# Patient Record
Sex: MALE | Race: WHITE | NOT HISPANIC OR LATINO | Employment: STUDENT
[De-identification: names, ages, dates, MRNs, and addresses within clinical notes are randomized per-mention and may not be internally consistent; named-entity substitution may affect disease eponyms.]

---
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
## 2023-08-09 RX ORDER — OXCARBAZEPINE 600 MG/1
600 TABLET, FILM COATED ORAL
COMMUNITY
Start: 2022-01-30 | End: 2023-10-10

## 2023-08-09 RX ORDER — AMANTADINE HYDROCHLORIDE 100 MG/1
100 CAPSULE, GELATIN COATED ORAL
COMMUNITY
Start: 2022-12-27

## 2023-08-09 RX ORDER — CLONAZEPAM 0.5 MG/1
0.5 TABLET ORAL
COMMUNITY
Start: 2023-06-12 | End: 2023-09-10

## 2023-08-09 RX ORDER — LURASIDONE HYDROCHLORIDE 80 MG/1
80 TABLET, FILM COATED ORAL
COMMUNITY
Start: 2022-03-03

## 2023-08-09 RX ORDER — CLONAZEPAM 2 MG/1
2 TABLET ORAL
COMMUNITY
Start: 2023-06-12 | End: 2023-09-10

## 2023-08-09 RX ORDER — GUANFACINE 1 MG/1
5 TABLET ORAL AT BEDTIME
COMMUNITY

## 2023-08-09 RX ORDER — OLANZAPINE 10 MG/1
TABLET, ORALLY DISINTEGRATING ORAL
COMMUNITY
Start: 2022-03-08

## 2023-08-09 RX ORDER — SERTRALINE HYDROCHLORIDE 100 MG/1
100 TABLET, FILM COATED ORAL 2 TIMES DAILY
COMMUNITY

## 2023-08-09 NOTE — OR NURSING
Pt has extensive medical history, mom called about faxing orders for labs to be drawn under sedation. Mother expressed pt will need sedation prior to having IV started.

## 2023-08-14 NOTE — H&P (VIEW-ONLY)
[facility]      Preoperative Consultation   Alfonzo Saldivar   : 2005   Gender: male    Date of Encounter: 2023  Date of Surgery: 23  Surgical Specialty: Gastroenterology  [facility] Facility: [facility]  Fax number: [phone]  Surgery type: outpatient  Primary Physician: Pcp, No     History of Present Illness     Alfonzo Saldivar is a 17 y.o. male (2005) who presents for preop evaluation undergoing colonoscopy and endoscopy       Review of Systems   A comprehensive review of systems was negative except for items noted in HPI.    Patient Active Problem List   Diagnosis Code     Autism F84.0     Acanthosis nigricans, acquired L83     BMI (body mass index) pediatric, > 99% for age, obese child, tertiary care intervention E66.9, Z68.54     Dental caries K02.9     Current Outpatient Medications   Medication Sig     amantadine HCL (SYMMETREL) 100 mg capsule Take 100 mg by mouth once daily.     clonazePAM (KLONOPIN) 1 mg tablet TAKE 1 TABLET BY MOUTH IN THE MORNING AND 2 TABLETS IN THE EVENING.     guanFACINE (INTUNIV ER) 4 mg Extended-Release tablet Take 4 mg by mouth once daily.     Latuda 80 mg tablet Take 80 mg by mouth.     OLANzapine (ZYPREXA ZYDIS) 10 mg disintegrating tablet TAKE 1 TABLET BY MOUTH EVERYDAY AS NEEDED FOR AGITATION FOR 60 DAYS     OXcarbazepine (TRILEPTAL) 600 mg tablet TAKE 1 TABLET BY MOUTH TWICE A DAY IN THE MORNING AND EVENING     sertraline (ZOLOFT) 100 mg tablet Take 100 mg by mouth once daily.     No current facility-administered medications for this visit.   No Known Allergies  Past Surgical History:   . Laterality Date     DENTAL REHABILITATION  2020    [facility]; with nasal intubation     DENTAL RESTORATIONS WITH X-RAYS  2012     Social History     Tobacco Use     Smoking status: Passive Smoke Exposure - Never Smoker     Smokeless tobacco: Never     Tobacco comments:     mother  and father smokes   Vaping Use     Vaping Use: Never used   Substance Use Topics     Alcohol use: No     Alcohol/week: 0.0 standard drinks of alcohol     Drug use: No     Family History   Problem Relation Age of Onset     Hypertension Mother      Depression Mother      Anxiety disorder Mother      Addiction problem Mother         opiates, in remission >10 years     Hypertension Father      Depression Father      Anxiety disorder Father      Depression Brother        PAST DIFFICULTY WITH ANESTHESIA: None     Physical Exam   /92 (Cuff Site: Left Arm, Position: Sitting, Cuff Size: Adult Large)   Pulse 88   Resp 20  There is no height or weight on file to calculate BMI.  General Appearance:  Alert, appropriate appearance for age. No acute distress  HEENT Exam:  Grossly normal. EYES: GABE, EOMI   EARS: cerumen impaction bilateral ears.  TM and ear canals were not well visualized. NOSE: normal.  Mallampati Airway Classification:  Class 1: Full visibility of tonsils, uvula and soft palate  Neck / Thyroid:  Supple, no masses, nodes or enlargement.  Chest/Respiratory: Normal chest wall and respirations. Clear to auscultation.  Cardiovascular: Regular rate and rhythm. S1, S2, no murmur, click, gallop, or rubs.  ABDOMEN:abdomen is soft without significant tenderness  Lymphatic Exam: Non-palpable nodes in neck, clavicular regions.  Musculoskeletal Exam: Back is straight and non-tender, baseline range of motion of upper and lower extremities.  Skin: no rash or abnormalities  Neurologic Exam: Normal gait, no tremor.  Psychiatric Exam: Alert, appropriate affect.      Assessment / Plan       The Pre-Op Tool    Recommendations      Low Risk Procedure    Cardiac History  No history of coronary artery disease           Labs  No routine labs indicated  EKG  Not indicated  Stress Testing  Not indicated    * Testing recommendations are intended to assist, but not direct, clinical decisions.           Take your other medications  as usual prior to the procedure  Hold vitamins and/or supplements for 1 week prior to the procedure  Hold fish oil for 2 weeks prior to the procedure  Okay to take Acetaminophen (Tylenol) up until the procedure  Hold / avoid NSAIDs (e.g. ibuprofen, naproxen) prior to procedure: 2 days for ibuprofen (Advil) and 4 days for naproxen (Aleve).    * Medication recommendations are not intended to be exhaustive; they are limited to common medications that are potentially dangerous if incorrectly managed          Labs  * Data supports elimination of  routine  laboratory testing in favor of focused,  indicated  testing based on medical co-morbidities. A 2009 study randomized 1061 patients undergoing ambulatory, non-cataract surgery to routine or to indicated testing. Perioperative adverse events were similar (Anesthesia & Analgesia 2009;108:467-75; Anesthesiol. Clin. 2016 Mar;34(1):43-58).  EKG  * The ACC/AHA recommends against obtaining routine EKGs in patients undergoing low risk surgeries, a class IIa recommendation (JACC. 2014;64(21);e1-76).     Session ID: [id number]  Endnotes and bibliography available upon request: [email or web address]    Labs: no  ECG: no    ICD-10-CM    1. Pre-op exam  Z01.818       2. Chronic diarrhea  K52.9       3. Autistic disorder  F84.0       4. Need for HPV vaccine  Z23     please provide HPV vaccine while under anesthesia      5. Impacted cerumen of both ears  H61.23     please clean bilateral ears while under anesthesia        Patient is cleared for planned procedure.   Electronically Signed by:   Ritika Knight MD   8/14/2023

## 2023-08-16 ENCOUNTER — HOSPITAL ENCOUNTER (OUTPATIENT)
Facility: CLINIC | Age: 18
Discharge: HOME | End: 2023-08-16
Attending: INTERNAL MEDICINE | Admitting: INTERNAL MEDICINE
Payer: COMMERCIAL

## 2023-08-16 ENCOUNTER — DOCUMENTATION ONLY (OUTPATIENT)
Dept: OTHER | Facility: CLINIC | Age: 18
End: 2023-08-16
Payer: COMMERCIAL

## 2023-08-16 ENCOUNTER — ANESTHESIA EVENT (OUTPATIENT)
Dept: SURGERY | Facility: CLINIC | Age: 18
End: 2023-08-16
Payer: COMMERCIAL

## 2023-08-16 ENCOUNTER — ANESTHESIA (OUTPATIENT)
Dept: SURGERY | Facility: CLINIC | Age: 18
End: 2023-08-16
Payer: COMMERCIAL

## 2023-08-16 VITALS
BODY MASS INDEX: 44.1 KG/M2 | WEIGHT: 315 LBS | HEIGHT: 71 IN | HEART RATE: 91 BPM | OXYGEN SATURATION: 99 % | TEMPERATURE: 98.9 F | SYSTOLIC BLOOD PRESSURE: 140 MMHG | DIASTOLIC BLOOD PRESSURE: 101 MMHG | RESPIRATION RATE: 18 BRPM

## 2023-08-16 PROCEDURE — 999N000141 HC STATISTIC PRE-PROCEDURE NURSING ASSESSMENT: Performed by: INTERNAL MEDICINE

## 2023-08-16 PROCEDURE — 250N000013 HC RX MED GY IP 250 OP 250 PS 637: Performed by: ANESTHESIOLOGY

## 2023-08-16 PROCEDURE — 250N000013 HC RX MED GY IP 250 OP 250 PS 637: Performed by: INTERNAL MEDICINE

## 2023-08-16 RX ORDER — OXYCODONE HYDROCHLORIDE 5 MG/1
5 TABLET ORAL
Status: CANCELLED | OUTPATIENT
Start: 2023-08-16

## 2023-08-16 RX ORDER — FENTANYL CITRATE 50 UG/ML
50 INJECTION, SOLUTION INTRAMUSCULAR; INTRAVENOUS EVERY 5 MIN PRN
Status: CANCELLED | OUTPATIENT
Start: 2023-08-16

## 2023-08-16 RX ORDER — HYDROMORPHONE HCL IN WATER/PF 6 MG/30 ML
0.4 PATIENT CONTROLLED ANALGESIA SYRINGE INTRAVENOUS EVERY 5 MIN PRN
Status: CANCELLED | OUTPATIENT
Start: 2023-08-16

## 2023-08-16 RX ORDER — ACETAMINOPHEN 325 MG/1
975 TABLET ORAL ONCE
Status: CANCELLED | OUTPATIENT
Start: 2023-08-16 | End: 2023-08-16

## 2023-08-16 RX ORDER — ONDANSETRON 4 MG/1
4 TABLET, ORALLY DISINTEGRATING ORAL EVERY 30 MIN PRN
Status: CANCELLED | OUTPATIENT
Start: 2023-08-16

## 2023-08-16 RX ORDER — HYDROMORPHONE HCL IN WATER/PF 6 MG/30 ML
0.2 PATIENT CONTROLLED ANALGESIA SYRINGE INTRAVENOUS EVERY 5 MIN PRN
Status: CANCELLED | OUTPATIENT
Start: 2023-08-16

## 2023-08-16 RX ORDER — ONDANSETRON 2 MG/ML
4 INJECTION INTRAMUSCULAR; INTRAVENOUS EVERY 30 MIN PRN
Status: CANCELLED | OUTPATIENT
Start: 2023-08-16

## 2023-08-16 RX ORDER — OXYCODONE HYDROCHLORIDE 5 MG/1
10 TABLET ORAL
Status: CANCELLED | OUTPATIENT
Start: 2023-08-16

## 2023-08-16 RX ORDER — FENTANYL CITRATE 50 UG/ML
25 INJECTION, SOLUTION INTRAMUSCULAR; INTRAVENOUS EVERY 5 MIN PRN
Status: CANCELLED | OUTPATIENT
Start: 2023-08-16

## 2023-08-16 RX ORDER — SODIUM CHLORIDE, SODIUM LACTATE, POTASSIUM CHLORIDE, CALCIUM CHLORIDE 600; 310; 30; 20 MG/100ML; MG/100ML; MG/100ML; MG/100ML
INJECTION, SOLUTION INTRAVENOUS CONTINUOUS
Status: DISCONTINUED | OUTPATIENT
Start: 2023-08-16 | End: 2023-08-16 | Stop reason: HOSPADM

## 2023-08-16 RX ORDER — SODIUM CHLORIDE, SODIUM LACTATE, POTASSIUM CHLORIDE, CALCIUM CHLORIDE 600; 310; 30; 20 MG/100ML; MG/100ML; MG/100ML; MG/100ML
INJECTION, SOLUTION INTRAVENOUS CONTINUOUS
Status: CANCELLED | OUTPATIENT
Start: 2023-08-16

## 2023-08-16 RX ORDER — FENTANYL CITRATE 50 UG/ML
25 INJECTION, SOLUTION INTRAMUSCULAR; INTRAVENOUS
Status: CANCELLED | OUTPATIENT
Start: 2023-08-16

## 2023-08-16 RX ORDER — LIDOCAINE 40 MG/G
CREAM TOPICAL
Status: DISCONTINUED | OUTPATIENT
Start: 2023-08-16 | End: 2023-08-16 | Stop reason: HOSPADM

## 2023-08-16 RX ADMIN — DIAZEPAM 10 MG: 5 SOLUTION ORAL at 09:30

## 2023-08-16 RX ADMIN — DIAZEPAM 10 MG: 5 SOLUTION ORAL at 11:31

## 2023-08-16 ASSESSMENT — ACTIVITIES OF DAILY LIVING (ADL)
ADLS_ACUITY_SCORE: 35

## 2023-08-16 NOTE — INTERVAL H&P NOTE
I have reviewed the surgical (or preoperative) H&P that is linked to this encounter, and examined the patient. There are no significant changes      Clinical Conditions Present on Arrival:  Clinically Significant Risk Factors Present on Admission

## 2023-08-16 NOTE — OR NURSING
"Patient and mother noticed by RN leaving the department, mother stated she \"could not wait any longer\" and that she'll contact pt's primary to get oral sedation to be given at home profylactically.   "

## 2023-08-16 NOTE — ANESTHESIA PREPROCEDURE EVALUATION
Anesthesia Pre-Procedure Evaluation    Patient: Alfonzo Saldivar   MRN: [id number] : 2005        Procedure : Procedure(s):  ESOPHAGOGASTRODUODENOSCOPY  WITH COLONOSCOPY          Past Medical History:   Diagnosis Date    Obese       History reviewed. No pertinent surgical history.   No Known Allergies   Social History     Tobacco Use    Smoking status: Never    Smokeless tobacco: Never   Substance Use Topics    Alcohol use: Not on file      Wt Readings from Last 1 Encounters:   21 (!) 156.5 kg (345 lb) (>99 %, Z= 3.51)*     * Growth percentiles are based on CDC (Boys, 2-20 Years) data.        Anesthesia Evaluation   Pt has had prior anesthetic.     No history of anesthetic complications       ROS/MED HX  ENT/Pulmonary:       Neurologic:     (+)                         Developmental delay, level of function: Low,      Cardiovascular:       METS/Exercise Tolerance: 3 - Able to walk 1-2 blocks without stopping    Hematologic:  - neg hematologic  ROS     Musculoskeletal:  - neg musculoskeletal ROS     GI/Hepatic:       Renal/Genitourinary:       Endo:     (+)               Obesity (BMI 48),       Psychiatric/Substance Use:     (+) psychiatric history other (comment) (Autism, severe)       Infectious Disease:       Malignancy:  - neg malignancy ROS     Other:            Physical Exam    Airway        Mallampati: II   TM distance: > 3 FB      Respiratory Devices and Support         Dental           Cardiovascular   cardiovascular exam normal       Rhythm and rate: regular and normal     Pulmonary   pulmonary exam normal            Other findings: Severe Obesity, Striations of the skin, able to verbalize but low cognitive functioning.  Spoke with mom and dad as medical decision makers.    OUTSIDE LABS:  CBC: No results found for: WBC, HGB, HCT, PLT  BMP: No results found for: NA, POTASSIUM, CHLORIDE, CO2, BUN, CR, GLC  COAGS: No results found for: PTT, INR, FIBR  POC: No results found for: BGM, HCG,  HCGS  HEPATIC: No results found for: ALBUMIN, PROTTOTAL, ALT, AST, GGT, ALKPHOS, BILITOTAL, BILIDIRECT, YAMILA  OTHER: No results found for: PH, LACT, A1C, OMAR, PHOS, MAG, LIPASE, AMYLASE, TSH, T4, T3, CRP, SED    Anesthesia Plan    ASA Status:  3    NPO Status:  NPO Appropriate    Anesthesia Type: MAC.     - Reason for MAC: immobility needed   Induction: Intravenous.           Consents    Anesthesia Plan(s) and associated risks, benefits, and realistic alternatives discussed. Questions answered and patient/representative(s) expressed understanding.     - Discussed:     - Discussed with:  Patient, Legal Guardian, Parent (Mother and/or Father)            Postoperative Care       PONV prophylaxis: Ondansetron (or other 5HT-3)     Comments:                Kathya Perrin MD

## 2023-08-16 NOTE — OR NURSING
Mom, Ktahya Saldivar, is Legal Guardian along with dad.  Called [employer] to advise.  Mom emailed paper work to [employer].  Called [employer] and spoke with Peg (parent is co-guardian and they must act jointly so need to call dad and get his consent too since mom is the only one here).  She clarified that both parents need to consent

## (undated) DEVICE — SOL WATER IRRIG 1000ML BOTTLE 2F7114

## (undated) DEVICE — TUBING SUCTION MEDI-VAC 1/4"X20' N620A - HE